# Patient Record
Sex: FEMALE | URBAN - METROPOLITAN AREA
[De-identification: names, ages, dates, MRNs, and addresses within clinical notes are randomized per-mention and may not be internally consistent; named-entity substitution may affect disease eponyms.]

---

## 2023-07-19 ENCOUNTER — APPOINTMENT (OUTPATIENT)
Dept: URBAN - METROPOLITAN AREA CLINIC 278 | Age: 63
Setting detail: DERMATOLOGY
End: 2023-07-19

## 2023-07-19 DIAGNOSIS — L50.8 OTHER URTICARIA: ICD-10-CM

## 2023-07-19 DIAGNOSIS — R60.0 LOCALIZED EDEMA: ICD-10-CM

## 2023-07-19 PROCEDURE — OTHER ADDITIONAL NOTES: OTHER

## 2023-07-19 PROCEDURE — OTHER OBSERVATION: OTHER

## 2023-07-19 PROCEDURE — 99203 OFFICE O/P NEW LOW 30 MIN: CPT

## 2023-07-19 PROCEDURE — OTHER TREATMENT REGIMEN: OTHER

## 2023-07-19 PROCEDURE — OTHER REASSURANCE: OTHER

## 2023-07-19 PROCEDURE — OTHER MIPS QUALITY: OTHER

## 2023-07-19 PROCEDURE — OTHER PRESCRIPTION: OTHER

## 2023-07-19 PROCEDURE — OTHER ORDER TESTS: OTHER

## 2023-07-19 PROCEDURE — OTHER COUNSELING: OTHER

## 2023-07-19 RX ORDER — CETIRIZINE HCL 1 MG/ML
SOLUTION, ORAL ORAL QD
Qty: 30 | Refills: 1 | Status: ERX | COMMUNITY
Start: 2023-07-19

## 2023-07-19 RX ORDER — FAMOTIDINE 20 MG/1
TABLET, FILM COATED ORAL
Qty: 60 | Refills: 1 | Status: ERX | COMMUNITY
Start: 2023-07-19

## 2023-07-19 RX ORDER — SALICYLIC ACID 3 G/100G
LOTION TOPICAL QD
Qty: 30 | Refills: 1 | Status: ERX | COMMUNITY
Start: 2023-07-19

## 2023-07-19 ASSESSMENT — LOCATION DETAILED DESCRIPTION DERM
LOCATION DETAILED: LEFT DISTAL POSTERIOR THIGH
LOCATION DETAILED: RIGHT INFERIOR MEDIAL LOWER BACK
LOCATION DETAILED: RIGHT CENTRAL MALAR CHEEK
LOCATION DETAILED: LEFT CENTRAL MALAR CHEEK
LOCATION DETAILED: RIGHT DISTAL POSTERIOR UPPER ARM

## 2023-07-19 ASSESSMENT — LOCATION SIMPLE DESCRIPTION DERM
LOCATION SIMPLE: RIGHT LOWER BACK
LOCATION SIMPLE: LEFT CHEEK
LOCATION SIMPLE: LEFT POSTERIOR THIGH
LOCATION SIMPLE: RIGHT CHEEK
LOCATION SIMPLE: RIGHT POSTERIOR UPPER ARM

## 2023-07-19 ASSESSMENT — LOCATION ZONE DERM
LOCATION ZONE: FACE
LOCATION ZONE: ARM
LOCATION ZONE: LEG
LOCATION ZONE: TRUNK

## 2023-07-19 NOTE — HPI: SECONDARY COMPLAINT
How Severe Is This Condition?: moderate
Additional History: Patient states she was taking medication for her Arthritis and she may think it has a relation to the rash.\\nPatient states this has been going on since Dec2022  on and off. \\nPatient

## 2023-07-19 NOTE — PROCEDURE: ADDITIONAL NOTES
Additional Notes: Patient states she has a life long history of Anemia. \\nPatient is on an iron supplement for her anemia.\\nPatient states she has not had her thyroid levels checked recently.

## 2023-07-19 NOTE — HPI: SKIN LESION
What Type Of Note Output Would You Prefer (Optional)?: Bullet Format
How Severe Is Your Skin Lesion?: mild
Has Your Skin Lesion Been Treated?: not been treated
Is This A New Presentation, Or A Follow-Up?: Growths
Additional History: Patient has puffiness under her eyes since April 2023.\\nPatient states that she has been under high stress since her mother passed and no family history of puffiness\\nPatient has tried using ice and topical ointment but it does not subside.

## 2023-07-19 NOTE — PROCEDURE: ADDITIONAL NOTES
Additional Notes: Lower lateral areas of bilateral cheeks not some bogginess but not truly edema, maybe a collagen issue with age, will check thyroid labs to see if there is any correlation, if negative will consult Dr Sandhu at [place] regarding cosmetic. Additional Notes: Lower lateral areas of bilateral cheeks not some bogginess but not truly edema, maybe a collagen issue with age, will check thyroid labs to see if there is any correlation, if negative will consult Dr Sandhu at [place] regarding cosmetic.

## 2023-07-19 NOTE — PROCEDURE: TREATMENT REGIMEN
Detail Level: Zone
Initiate Treatment: Allegra Allergy 180 mg tablet Qd\\nPepcid 20 mg tablet \\nZyrtec 10 mg tablet QD\\Amy needed when hives are present.

## 2023-07-19 NOTE — PROCEDURE: ADDITIONAL NOTES
Additional Notes: Patient states she will have random papules appear around her body that are painful and burning.\\nNo papules on patients body to view today. \\nPatient provided pictures of when the papules were on her body and severe. \\nPatient has severe arthritis and thinks papules could be a drug induced reaction.\\nPatient advised to keep a photo diary of when the papules occur.

## 2023-07-19 NOTE — PROCEDURE: ADDITIONAL NOTES
Additional Notes: Patient noticed the papular urticaria lesions while taking Orencia for her rheumatoid arthritis, she stopped Orencia in April and the rash resolved, recently started Mobic for arthritis pain and the papular urticaria came back.